# Patient Record
Sex: FEMALE | Race: WHITE | NOT HISPANIC OR LATINO | URBAN - METROPOLITAN AREA
[De-identification: names, ages, dates, MRNs, and addresses within clinical notes are randomized per-mention and may not be internally consistent; named-entity substitution may affect disease eponyms.]

---

## 2018-08-15 ENCOUNTER — HISTORICAL (OUTPATIENT)
Dept: ADMINISTRATIVE | Facility: HOSPITAL | Age: 33
End: 2018-08-15

## 2018-08-17 LAB — FINAL CULTURE: NO GROWTH

## 2018-12-27 ENCOUNTER — HISTORICAL (OUTPATIENT)
Dept: ADMINISTRATIVE | Facility: HOSPITAL | Age: 33
End: 2018-12-27

## 2018-12-29 LAB
FINAL CULTURE: NO GROWTH
FINAL CULTURE: NORMAL

## 2022-04-09 ENCOUNTER — HISTORICAL (OUTPATIENT)
Dept: ADMINISTRATIVE | Facility: HOSPITAL | Age: 37
End: 2022-04-09

## 2022-04-25 VITALS
OXYGEN SATURATION: 100 % | WEIGHT: 165 LBS | DIASTOLIC BLOOD PRESSURE: 72 MMHG | HEIGHT: 62 IN | BODY MASS INDEX: 30.36 KG/M2 | SYSTOLIC BLOOD PRESSURE: 109 MMHG

## 2022-04-29 NOTE — PROGRESS NOTES
Patient:   Niurka Cardoso             MRN: [id number]            FIN: [id number]               Age:   32 years     Sex:  Female     :  1985   Associated Diagnoses:   11 weeks gestation of pregnancy; Encounter for fetal anatomic survey; History of primary TB; Pregnant   Author:   Elvin Rachel MD      Chief Complaint   The patient presents for initial prenatal visit.      Interval History   33 yo  at 18^0 WGA by LMP consistent with 18^0 WGA US. (EDC: 2018)    Current Issues: N/V preventing her from taking celexa,     Chronic Issues: Postpartum depression, hx UTI, hx of carpal tunnel in pregnacy.    Gestational History:   - G1: , SAB @ 6 wga   - G2: 2013,  @ 40 WGA, 3515g, F, Epidural, baby had jaundice   - G3: Current Pregnancy   Gyn History:    - LMP: 2018   - Age at menarche: 13 years   - Menstrual hx: regular, 31 day cycles, 1 pads/day, 6 days per period   - History of birth control:  Nuvaring    - History of STDs and/or Abnormal PAPs: [facility]     Past Medical History: Major Depression, ELIDA, Postpartum depression, IBS, GERD, pseudocholinesterase defx, hx TB in  (prev treated).  Surgical History: Denies  Family History: Aunts, uncles bipolar.   Social History: MJ use before pregnancy, stopped when found she was pregnant  Medications: See EMR      Histories      Review of Systems   Antepartum specific     - Fetal movements: Reports   - Vaginal bleeding: Denies   - Vaginal discharge: Denies   - Loss of fluid: Denies   - Contractions: Denies   - Headaches: Denies   - Vision changes: Denies   - Edema: Denies      Constitutional:  No fever, No chills, No sweats, No weakness.    Eye:  See HPI.    Ear/Nose/Mouth/Throat:  No nasal congestion, No sore throat.    Respiratory:  No shortness of breath, No cough, No wheezing.    Cardiovascular:  Peripheral edema, No palpitations, No bradycardia, No syncope.    Gastrointestinal:  See HPI.    Genitourinary:  See  HPI.    Gynecologic:  See HPI.    Musculoskeletal:  See HPI.       Health Status   Allergies:    Allergic Reactions (Selected)  Severity Not Documented  Codeine- Hyperactivity.      Physical Examination   Vital Signs   8/15/2018 10:30 CDT      Temperature Oral          37.0 DegC                             Temperature Oral (calculated)             98.60 DegF                             Peripheral Pulse Rate     67 bpm                             Respiratory Rate          20 br/min                             SpO2                      99 %                             Systolic Blood Pressure   119 mmHg                             Diastolic Blood Pressure  71 mmHg                             Mean Arterial Pressure, Cuff              87 mmHg                             Blood Pressure Location   Right arm                             Blood Pressure Cuff Size  Adult     General:  Alert and oriented, No acute distress.    Eye:  Pupils are equal, round and reactive to light, Extraocular movements are intact.    Respiratory:  Lungs are clear to auscultation, Respirations are non-labored, Breath sounds are equal.    Cardiovascular:  Normal rate, Regular rhythm, No murmur, No edema.    Gastrointestinal:  Soft, Non-tender, Gravid.    Obstetric Exam     Uterus: fundal height 18  cm.     Baby: heart tones See 1st TM US.        Review / Management     Initial OB Labs: Pending   - Blood Type and Rh: _   - Antibody Screen: _   - CBC H/H: _   - HIV: _   - RPR: _   - GC: _   - CT: _   - HBsAg: _   - HCVAb: _   - Rubella: _   - Varicella: _   - UA & Culture: _   - Sickle Cell Screen: _   - PAP: _   - Influenza vaccine date: _    15-20 Weeks Lab    - Quad Screen: _    28 Week Lab    - 1H GTT: _    - Rhogam: _    - Date of Tdap: _    - CBC H/H: _    - RPR: _    36 Week Lab    - CBC H/H: _    - RPR: _    - GBS Culture: _    - HIV: _    - Urine: GC: _    Ultrasound  -Initial US: 2nd trimester intrauterine pregnancy measuring 18w0d for EDC  of 01/16/2019 consistent with menstrual date of 01/19/2019. Ratio calculations withn range. EFW is 223.45gm [8oz] appropriate for gestational age. FHR: 132; Gender: Female. No fetal anomalies noted    -20 wk anatomy US:      Results review:  Lab results   8/15/2018 9:25 CDT       Urine Color Urine Dipstick                Yellow                             Urine Appearance Urine Dipstick           Slightly cloudy                             pH Urine Dipstick         7.5                             Specific Gravity Urine Dipstick           1.005                             Blood Urine Dipstick      Negative                             Glucose Urine Dipstick    Negative                             Ketones Urine Dipstick    Negative                             Protein Urine Dipstick    Trace                             Bilirubin Urine Dipstick  Negative                             Urobilinogen Urine Dipstick               0.2 mg/dl                             Leukocytes Urine Dipstick Negative                             Nitrite Urine Dipstick    Negative  .       Impression and Plan   Diagnosis     11 weeks gestation of pregnancy ([id number] Z3A.11).     Encounter for fetal anatomic survey (MLW33-IL Z36.89).     History of primary TB ([id number]-YJ Z86.11).     Pregnant (KGU97-VP Z34.90).       1. IUP    - OB Protocol     - PNVs    - Urine dip:     - Routine (initial) labs: pending    - Mother plans on breast and/or bottlefeeds    - Postpartum contraception discussion: _    - Labor precautions discussed in depth    - Assign to [facility]  2. History of TB    -Previous treated    -PPD ordered 8/15/2018  3. Major Depression    -8/15/2018, Patient on Buspar and Celexa    -Not able to take above due to nausea and vomiting  4. Nausea, vomiting    -prescribed doxylamine and Vit B6    -educated patient that the doxylamine may make her sleepy  5. Hx Postpartum Depression    -Monitor for s/s postpartum    Educated patient this visit on the  following: safe meds in pregnancy, PNV compliance, complications of first trimester.  Strict ED precautions given for: ectopic/ SAB / VB/ pyelo / dehydration / unable to tolerate po intake/ pelvic or abdominal pain. Patient expressed understanding.    RTC in 4 wks with [facility]  Dispo: Anticipate

## 2022-04-29 NOTE — PROGRESS NOTES
Patient:   Niurka Cardoso             MRN: [id number]            FIN: [id number]               Age:   32 years     Sex:  Female     :  1985   Associated Diagnoses:   None   Author:   Jeremy Monae MD      Physician: AILYN  Reason for Exam:INITIAL PRENATAL VISIT; FETAL ANATOMY  :3  Parity:1  Gestational Age:17w4d  EDC: 2019    Examination:  SID: ADEQUATE  Fetal Tone: PRESENT  Fetal Movement: PRESENT  Fetal Heart Rate:132  Fetus: SINGLE  Presentation: CEPH  Placenta:       Position: POSTERIOR      Grade: I    Measurements:  BPD: 3.93cm  [18w0d]  HC:14.93cm  [18w0d]  AC: 12.44cm  [18w0d]  FL: 2.67cm   [18w1d]    AGA:     18w0d  EFW:     223.45gm [8oz]  EDC:     2019    Ratio Calculations:  CI(Hadlock) :       70.70  [70.00-86.00]  FL/BPD(Norah) :   68.03  [     ]  HC/AC(Dwyer):   1.20   [1.08-1.27]  FL/AC(Hadlock) :   21.49  [     ]  FL/HC(Hadlock) :   17.90  [15.80-18.00]      Anatomy:  Head    Cerebellum: NORMAL    Choroid plexus: NORMAL  Face     Orbits: NORMAL     Nose: NORMAL     Lips: NORMAL  Heart      Four chamber: NORMAL      Arch: NORMAL      Outflow: NORMAL  Stomach: NORMAL  Kidneys: NORMAL  Bladder: NORMAL  Cord insertion: NORMAL  Cord: NORMAL  Extremities      Upper: NORMAL      Lower: NORMAL  Gender: FEMALE  Spine : NORMAL    Comments: 2nd trimester intrauterine pregnancy measuring 18w0d for EDC of 2019 consistent with menstrual date of 2019. Ratio calculations withn range. EFW is 223.45gm [8oz] appropriate for gestational age. FHR: 132; Gender: Female. No fetal anomalies noted          this document has images    01-Mar-2020

## 2025-06-18 DIAGNOSIS — Z30.432 ENCOUNTER FOR IUD REMOVAL: Primary | ICD-10-CM
